# Patient Record
Sex: FEMALE | Race: BLACK OR AFRICAN AMERICAN | NOT HISPANIC OR LATINO | URBAN - METROPOLITAN AREA
[De-identification: names, ages, dates, MRNs, and addresses within clinical notes are randomized per-mention and may not be internally consistent; named-entity substitution may affect disease eponyms.]

---

## 2020-02-25 NOTE — PATIENT DISCUSSION
OD 1st/ Symfony OD Bernadette/ Custom OD Bernadette/B+ OD Bernadette/B OD Bernadette.
OS 2nd/TMF OS Bernadette 325/Custom OS Bernadette/B+ OS Bernadette/B OS Bernadette.
Patient understands condition, prognosis and need for follow up care.
Reassess after #1 stable.
Recommended observation.
The patient feels that the cataract is significantly impacting daily activities and has elected cataract surgery. The risks, benefits, and alternatives to surgery were discussed. The patient elects to proceed with surgery.
The types of intraocular lenses were reviewed with the patient along with a discussion of their various strengths and weaknesses.
monitor.
No

## 2020-06-10 NOTE — PATIENT DISCUSSION
Patient advised of the right to post-operative care by the surgeon. Patient is fully informed of, and agreed to, co-management with their primary optometric physician. Post-operative care by the surgeon is not medically necessary and co-management is clinically appropriate. Patient has received itemization of fees related to cataract surgery. Transfer of care letter completed for the patient. Transfer care of right eye to Dr. Bubba Farnsworth on 6.11.20. Patient instructed to call immediately if any new distortion, blurring, decreased vision or eye pain.

## 2020-06-17 NOTE — PATIENT DISCUSSION
Patient advised of the right to post-operative care by the surgeon. Patient is fully informed of, and agreed to, co-management with their primary optometric physician. Post-operative care by the surgeon is not medically necessary and co-management is clinically appropriate. Patient has received itemization of fees related to cataract surgery. Transfer of care letter completed for the patient. Transfer care of right eye to Dr. Kelsi Ansari on 6.11.20. Patient instructed to call immediately if any new distortion, blurring, decreased vision or eye pain.

## 2020-06-17 NOTE — PATIENT DISCUSSION
The types of intraocular lenses were reviewed with the patient along with a discussion of their various strengths and weaknesses. Pt wishes to proceed with surgery OS/ Basic Plus Goal Bernadette.

## 2020-06-17 NOTE — PATIENT DISCUSSION
Patient advised of the right to post-operative care by the surgeon. Patient is fully informed of, and agreed to, co-management with their primary optometric physician. Post-operative care by the surgeon is not medically necessary and co-management is clinically appropriate. Patient has received itemization of fees related to cataract surgery. Transfer of care letter completed for the patient. Transfer care of left eye to Dr. Lolita Parsons on 6.18.20. Patient instructed to call immediately if any new distortion, blurring, decreased vision or eye pain.

## 2020-06-17 NOTE — PATIENT DISCUSSION
Patient advised of the right to post-operative care by the surgeon. Patient is fully informed of, and agreed to, co-management with their primary optometric physician. Post-operative care by the surgeon is not medically necessary and co-management is clinically appropriate. Patient has received itemization of fees related to cataract surgery. Transfer of care letter completed for the patient. Transfer care of left eye to Dr. Ngoc David on 6.18.20. Patient instructed to call immediately if any new distortion, blurring, decreased vision or eye pain.

## 2021-10-28 ENCOUNTER — ESTABLISHED PATIENT (OUTPATIENT)
Dept: URBAN - METROPOLITAN AREA CLINIC 10 | Facility: CLINIC | Age: 36
End: 2021-10-28

## 2021-10-28 DIAGNOSIS — H20.00: ICD-10-CM

## 2021-10-28 PROCEDURE — 99211NC NO CHARGE VISIT

## 2021-10-28 RX ORDER — TOBRAMYCIN AND DEXAMETHASONE 1; 3 MG/ML; MG/ML: 1 SUSPENSION/ DROPS OPHTHALMIC

## 2022-06-03 ENCOUNTER — ESTABLISHED PATIENT (OUTPATIENT)
Dept: URBAN - METROPOLITAN AREA CLINIC 7 | Facility: CLINIC | Age: 37
End: 2022-06-03

## 2022-06-03 DIAGNOSIS — H52.13: ICD-10-CM

## 2022-06-03 PROCEDURE — 92014CEP ESTABLISHED PATIENT- EMPLOYEE ROUTINE COMP EXAM

## 2022-06-03 ASSESSMENT — KERATOMETRY
OD_K1POWER_DIOPTERS: 41.50
OD_AXISANGLE2_DEGREES: 78
OS_AXISANGLE2_DEGREES: 97
OS_K1POWER_DIOPTERS: 41.75
OD_K2POWER_DIOPTERS: 42.50
OS_K2POWER_DIOPTERS: 43.00
OS_AXISANGLE_DEGREES: 7
OD_AXISANGLE_DEGREES: 168

## 2022-06-03 ASSESSMENT — VISUAL ACUITY
OU_SC: 20/30-1
OS_SC: 20/70
OD_SC: 20/100

## 2022-06-21 RX ORDER — NORELGESTROMIN AND ETHINYL ESTRADIOL 150; 35 UG/D; UG/D
PATCH TRANSDERMAL
COMMUNITY
End: 2022-08-12 | Stop reason: SINTOL

## 2022-08-12 PROBLEM — N92.1 BREAKTHROUGH BLEEDING ON CONTRACEPTIVE PATCH: Status: ACTIVE | Noted: 2022-08-12

## 2022-10-21 PROBLEM — E66.9 OBESITY WITH BODY MASS INDEX 30 OR GREATER: Status: ACTIVE | Noted: 2022-10-21

## 2022-10-21 PROBLEM — Z97.3 USES CONTACT LENSES: Status: ACTIVE | Noted: 2022-10-21

## 2022-10-21 PROBLEM — Z97.3 WEARS GLASSES: Status: ACTIVE | Noted: 2022-10-21

## 2022-11-21 ENCOUNTER — EMERGENCY VISIT (OUTPATIENT)
Dept: URBAN - METROPOLITAN AREA CLINIC 8 | Facility: CLINIC | Age: 37
End: 2022-11-21

## 2022-11-21 DIAGNOSIS — H00.13: ICD-10-CM

## 2022-11-21 PROCEDURE — 99211NC NO CHARGE VISIT

## 2023-07-19 ENCOUNTER — ESTABLISHED PATIENT (OUTPATIENT)
Dept: URBAN - METROPOLITAN AREA CLINIC 10 | Facility: CLINIC | Age: 38
End: 2023-07-19

## 2023-07-19 DIAGNOSIS — H52.13: ICD-10-CM

## 2023-07-19 PROCEDURE — 92014 COMPRE OPH EXAM EST PT 1/>: CPT

## 2023-07-19 PROCEDURE — 92310C CONTACT LENS 75

## 2023-07-19 PROCEDURE — 92015 DETERMINE REFRACTIVE STATE: CPT

## 2023-07-19 ASSESSMENT — KERATOMETRY
OD_AXISANGLE2_DEGREES: 70
OS_AXISANGLE_DEGREES: 5
OD_AXISANGLE_DEGREES: 160
OS_K1POWER_DIOPTERS: 42.00
OS_AXISANGLE2_DEGREES: 95
OD_K2POWER_DIOPTERS: 42.75
OS_K2POWER_DIOPTERS: 43.25
OD_K1POWER_DIOPTERS: 41.75

## 2023-07-19 ASSESSMENT — VISUAL ACUITY
OU_CC: 20/20
OS_CC: 20/20-2
OD_CC: 20/20

## 2023-07-19 ASSESSMENT — TONOMETRY
OD_IOP_MMHG: 14
OS_IOP_MMHG: 14

## 2024-07-22 ENCOUNTER — COMPREHENSIVE EXAM (OUTPATIENT)
Dept: URBAN - METROPOLITAN AREA CLINIC 10 | Facility: CLINIC | Age: 39
End: 2024-07-22

## 2024-07-22 DIAGNOSIS — H52.13: ICD-10-CM

## 2024-07-22 PROCEDURE — 92310C CONTACT LENS 75

## 2024-07-22 PROCEDURE — 92014 COMPRE OPH EXAM EST PT 1/>: CPT

## 2024-07-22 PROCEDURE — 92015 DETERMINE REFRACTIVE STATE: CPT

## 2024-07-22 ASSESSMENT — KERATOMETRY
OD_K2POWER_DIOPTERS: 42.75
OS_K1POWER_DIOPTERS: 42.00
OD_AXISANGLE2_DEGREES: 70
OD_AXISANGLE_DEGREES: 160
OS_AXISANGLE2_DEGREES: 95
OS_AXISANGLE_DEGREES: 5
OD_K1POWER_DIOPTERS: 41.75
OS_K2POWER_DIOPTERS: 43.25

## 2024-07-22 ASSESSMENT — VISUAL ACUITY
OU_CC: 20/20
OD_CC: 20/20
OS_CC: 20/20

## 2024-07-22 ASSESSMENT — TONOMETRY
OS_IOP_MMHG: 14
OD_IOP_MMHG: 15

## 2025-07-01 NOTE — PATIENT DISCUSSION
Patient advised of the right to post-operative care by the surgeon. Patient is fully informed of, and agreed to, co-management with their primary optometric physician. Post-operative care by the surgeon is not medically necessary and co-management is clinically appropriate. Patient has received itemization of fees related to cataract surgery. Transfer of care letter completed for the patient. Transfer care of right eye to Dr. Jennifer Wilhelm on 6.11.20. Patient instructed to call immediately if any new distortion, blurring, decreased vision or eye pain. Mercedes Arvizu